# Patient Record
Sex: FEMALE | Race: BLACK OR AFRICAN AMERICAN | URBAN - METROPOLITAN AREA
[De-identification: names, ages, dates, MRNs, and addresses within clinical notes are randomized per-mention and may not be internally consistent; named-entity substitution may affect disease eponyms.]

---

## 2022-09-15 PROBLEM — 275978004 COLON CANCER SCREENING: Status: ACTIVE | Noted: 2022-09-15

## 2022-11-29 ENCOUNTER — OFFICE VISIT (OUTPATIENT)
Dept: URBAN - METROPOLITAN AREA SURGERY CENTER 16 | Facility: SURGERY CENTER | Age: 77
End: 2022-11-29

## 2022-11-29 ENCOUNTER — CLAIMS CREATED FROM THE CLAIM WINDOW (OUTPATIENT)
Dept: URBAN - METROPOLITAN AREA SURGERY CENTER 16 | Facility: SURGERY CENTER | Age: 77
End: 2022-11-29
Payer: MEDICARE

## 2022-11-29 DIAGNOSIS — Z12.11 COLON CANCER SCREENING: ICD-10-CM

## 2022-11-29 PROCEDURE — G8907 PT DOC NO EVENTS ON DISCHARG: HCPCS | Performed by: INTERNAL MEDICINE

## 2022-11-29 PROCEDURE — G0121 COLON CA SCRN NOT HI RSK IND: HCPCS | Performed by: INTERNAL MEDICINE

## 2022-11-29 RX ORDER — LOVASTATIN 20 MG/1
TAKE 1 TABLET (20 MG) BY ORAL ROUTE ONCE DAILY WITH THE EVENING MEAL TABLET ORAL 1
Qty: 0 | Refills: 0 | Status: ACTIVE | COMMUNITY
Start: 1900-01-01 | End: 1900-01-01

## 2023-02-23 ENCOUNTER — OFFICE VISIT (OUTPATIENT)
Dept: URBAN - METROPOLITAN AREA CLINIC 105 | Facility: CLINIC | Age: 78
End: 2023-02-23
Payer: MEDICARE

## 2023-02-23 ENCOUNTER — DASHBOARD ENCOUNTERS (OUTPATIENT)
Age: 78
End: 2023-02-23

## 2023-02-23 VITALS
TEMPERATURE: 97 F | HEART RATE: 72 BPM | WEIGHT: 119 LBS | DIASTOLIC BLOOD PRESSURE: 77 MMHG | SYSTOLIC BLOOD PRESSURE: 169 MMHG | HEIGHT: 60 IN | BODY MASS INDEX: 23.36 KG/M2

## 2023-02-23 DIAGNOSIS — R54 ADVANCED AGE: ICD-10-CM

## 2023-02-23 DIAGNOSIS — K57.30 DIVERTICULAR DISEASE OF COLON: ICD-10-CM

## 2023-02-23 DIAGNOSIS — R10.13 DYSPEPSIA: ICD-10-CM

## 2023-02-23 DIAGNOSIS — E55.9 VITAMIN D DEFICIENCY DISEASE: ICD-10-CM

## 2023-02-23 DIAGNOSIS — K21.00 GASTROESOPHAGEAL REFLUX DISEASE WITH ESOPHAGITIS WITHOUT HEMORRHAGE: ICD-10-CM

## 2023-02-23 PROBLEM — 49808004: Status: ACTIVE | Noted: 2023-02-23

## 2023-02-23 PROBLEM — 266433003: Status: ACTIVE | Noted: 2023-02-23

## 2023-02-23 PROBLEM — 733657002: Status: ACTIVE | Noted: 2023-02-23

## 2023-02-23 PROBLEM — 34713006: Status: ACTIVE | Noted: 2023-02-23

## 2023-02-23 PROCEDURE — 99214 OFFICE O/P EST MOD 30 MIN: CPT | Performed by: INTERNAL MEDICINE

## 2023-02-23 RX ORDER — LOVASTATIN 20 MG/1
TAKE 1 TABLET (20 MG) BY ORAL ROUTE ONCE DAILY WITH THE EVENING MEAL TABLET ORAL 1
Qty: 0 | Refills: 0 | Status: ACTIVE | COMMUNITY
Start: 1900-01-01

## 2023-02-23 RX ORDER — FAMOTIDINE 40 MG/1
1 TABLET AT BEDTIME TABLET, FILM COATED ORAL ONCE A DAY
Qty: 90 TABLET | Refills: 3 | OUTPATIENT
Start: 2023-02-23

## 2023-02-23 NOTE — HPI-TODAY'S VISIT:
The  patient with historty of HTN and diverticulalr disease of the colon who presents for f/u office visit. The pt is s/p colon 11/2022 + for diverticular disease of the colon and was negative for polyps. The pt notes that she is regular basis. The pt notes that she is having intermittent reflux and will have symptoms of heartburn and iindigestion. The pt will not need another colon.

## 2023-02-23 NOTE — PHYSICAL EXAM NEUROLOGIC:
SW following for discharge planning. Chart reviewed, discussed with RN, pt is from home with 
wife, gets around fine. RN advised no SW needs at this time. SW will continue to follow. oriented to person, place and time , normal sensation , short and long term memory intact

## 2023-06-01 ENCOUNTER — OFFICE VISIT (OUTPATIENT)
Dept: URBAN - METROPOLITAN AREA CLINIC 105 | Facility: CLINIC | Age: 78
End: 2023-06-01